# Patient Record
Sex: MALE | Race: WHITE | NOT HISPANIC OR LATINO | Employment: FULL TIME | URBAN - METROPOLITAN AREA
[De-identification: names, ages, dates, MRNs, and addresses within clinical notes are randomized per-mention and may not be internally consistent; named-entity substitution may affect disease eponyms.]

---

## 2017-01-10 ENCOUNTER — OFFICE VISIT (OUTPATIENT)
Dept: URGENT CARE | Facility: CLINIC | Age: 32
End: 2017-01-10
Payer: COMMERCIAL

## 2017-01-10 VITALS
OXYGEN SATURATION: 94 % | DIASTOLIC BLOOD PRESSURE: 66 MMHG | HEIGHT: 70 IN | WEIGHT: 175 LBS | SYSTOLIC BLOOD PRESSURE: 128 MMHG | BODY MASS INDEX: 25.05 KG/M2 | TEMPERATURE: 98.1 F | HEART RATE: 78 BPM

## 2017-01-10 DIAGNOSIS — B86 SCABIES: Primary | ICD-10-CM

## 2017-01-10 PROCEDURE — 99204 OFFICE O/P NEW MOD 45 MIN: CPT | Performed by: PHYSICIAN ASSISTANT

## 2017-01-10 RX ORDER — PERMETHRIN 50 MG/G
1 CREAM TOPICAL ONCE
Qty: 1 TUBE | Refills: 1 | Status: SHIPPED | OUTPATIENT
Start: 2017-01-10 | End: 2017-01-10

## 2017-01-10 NOTE — PATIENT INSTRUCTIONS
CONTROL OF TRANSMISSION -- Because scabies is transmitted by close or skin-to-skin contact, usual recommendations are that all members of the family and close contacts be treated at the same time to avoid an endless chain of cross contamination and reinfestation [63]. However, high quality randomized trials to confirm the efficacy of this practice are lacking [64].   Although fomite transfer of scabies is less of a concern than with head lice, there is some possibility that contaminated clothing or linens can result in spread of scabies or reinfestation. In general, scabies mites cannot survive for more than two to three days away from human skin. Appropriate options for items used within several days before treatment (clothing, linens, stuffed animals, etc.) include placing in a plastic bag for at least three days, machine washing with hot water and then ironing or drying in a hot dryer, or dry cleaning [38]. Fumigation of living areas is not indicated. Affected individuals can return to work, , or school the day after treatment [65].  Fomite transfer is a particular concern with crusted scabies. As a result, patients with crusted scabies should be isolated immediately, and strict barrier nursing procedures instituted if transmission to others is to be avoided. Serious institutional epidemics of scabies have resulted from failure to recognize the disease and take proper precautions [20]. Rooms previously used by patients with crusted scabies should be vacuumed and cleaned thoroughly and bedding should be washed and dried utilizing high heat cycles; pesticide sprays and fogs are not indicated [65,66]. Scabies contracted by healthy individuals from contact with crusted scabies is no different from ordinary scabies.

## 2017-01-10 NOTE — MR AVS SNAPSHOT
"        Boone LOPEZ Rossana   1/10/2017 3:30 PM   Office Visit   MRN: [id number]    Department:  [facility]   Dept Phone:  [phone]    Description:  Male : 1985   Provider:  Gerardo Thompson PA-C           Reason for Visit     Rash scabies      Allergies as of 1/10/2017     Allergen Noted Reactions    Ibuprofen 01/10/2017   Anaphylaxis    Honey 01/10/2017   Anaphylaxis, Swelling      You were diagnosed with     Scabies   [133.0.ICD-9-CM]  -  Primary       Vital Signs     Blood Pressure Pulse Temperature Height Weight Body Mass Index    128/66 mmHg 78 36.7 °C (98.1 °F) 1.778 m (5' 10\") 79.379 kg (175 lb) 25.11 kg/m2    Oxygen Saturation                   94%           Basic Information     Date Of Birth Sex Race Ethnicity Preferred Language    1985 Male White Non- English      Health Maintenance        Date Due Completion Dates    IMM DTaP/Tdap/Td Vaccine (1 - Tdap) 6/10/2005 ---    IMM INFLUENZA (1) 2016 ---            Current Immunizations     No immunizations on file.      Below and/or attached are the medications your provider expects you to take. Review all of your home medications and newly ordered medications with your provider and/or pharmacist. Follow medication instructions as directed by your provider and/or pharmacist. Please keep your medication list with you and share with your provider. Update the information when medications are discontinued, doses are changed, or new medications (including over-the-counter products) are added; and carry medication information at all times in the event of emergency situations     Allergies:  IBUPROFEN - Anaphylaxis     HONEY - Anaphylaxis,Swelling               Medications  Valid as of: January 10, 2017 -  4:03 PM    Generic Name Brand Name Tablet Size Instructions for use    Permethrin (Cream) ELIMITE 5 % Apply 1 Application to affected area(s) Once for 1 dose.        .                 Medicines prescribed today were sent to:    " [facility] #[id number] - [place], [place] - [address] [facility] [address]    [address] [facility] [address] [place] [address]    Phone: [phone] Fax: [phone]    Open 24 Hours?: No      Medication refill instructions:       If your prescription bottle indicates you have medication refills left, it is not necessary to call your provider’s office. Please contact your pharmacy and they will refill your medication.    If your prescription bottle indicates you do not have any refills left, you may request refills at any time through one of the following ways: The online [employer] system (except Urgent Care), by calling your provider’s office, or by asking your pharmacy to contact your provider’s office with a refill request. Medication refills are processed only during regular business hours and may not be available until the next business day. Your provider may request additional information or to have a follow-up visit with you prior to refilling your medication.   *Please Note: Medication refills are assigned a new Rx number when refilled electronically. Your pharmacy may indicate that no refills were authorized even though a new prescription for the same medication is available at the pharmacy. Please request the medicine by name with the pharmacy before contacting your provider for a refill.        Instructions    CONTROL OF TRANSMISSION -- Because scabies is transmitted by close or skin-to-skin contact, usual recommendations are that all members of the family and close contacts be treated at the same time to avoid an endless chain of cross contamination and reinfestation [63]. However, high quality randomized trials to confirm the efficacy of this practice are lacking [64].   Although fomite transfer of scabies is less of a concern than with head lice, there is some possibility that contaminated clothing or linens can result in spread of scabies or reinfestation. In general, scabies mites cannot survive for more than two to three days away  from human skin. Appropriate options for items used within several days before treatment (clothing, linens, stuffed animals, etc.) include placing in a plastic bag for at least three days, machine washing with hot water and then ironing or drying in a hot dryer, or dry cleaning [38]. Fumigation of living areas is not indicated. Affected individuals can return to work, , or school the day after treatment [65].  Fomite transfer is a particular concern with crusted scabies. As a result, patients with crusted scabies should be isolated immediately, and strict barrier nursing procedures instituted if transmission to others is to be avoided. Serious institutional epidemics of scabies have resulted from failure to recognize the disease and take proper precautions [20]. Rooms previously used by patients with crusted scabies should be vacuumed and cleaned thoroughly and bedding should be washed and dried utilizing high heat cycles; pesticide sprays and fogs are not indicated [65,66]. Scabies contracted by healthy individuals from contact with crusted scabies is no different from ordinary scabies.             SkyGiraffe Access Code: [email or web address]  Expires: 2/9/2017  3:27 PM    SkyGiraffe  A secure, online tool to manage your health information     [employer]’s SkyGiraffe® is a secure, online tool that connects you to your personalized health information from the privacy of your home -- day or night - making it very easy for you to manage your healthcare. Once the activation process is completed, you can even access your medical information using the SkyGiraffe fantasma, which is available for free in the Apple Fantasma store or Google Play store.     SkyGiraffe provides the following levels of access (as shown below):   My Chart Features   [facility] Doctor [facility]  Specialists [facility]  Urgent  Care [facility]  Primary Care  Doctor   Email your healthcare team securely and privately 24/7 X X X    Manage appointments: schedule  your next appointment; view details of past/upcoming appointments X      Request prescription refills. X      View recent personal medical records, including lab and immunizations X X X X   View health record, including health history, allergies, medications X X X X   Read reports about your outpatient visits, procedures, consult and ER notes X X X X   See your discharge summary, which is a recap of your hospital and/or ER visit that includes your diagnosis, lab results, and care plan. X X       How to register for [employer]:  1. Go to  [email or web address].  2. Click on the Sign Up Now box, which takes you to the New Member Sign Up page. You will need to provide the following information:  a. Enter your Nestio Access Code exactly as it appears at the top of this page. (You will not need to use this code after you’ve completed the sign-up process. If you do not sign up before the expiration date, you must request a new code.)   b. Enter your date of birth.   c. Enter your home email address.   d. Click Submit, and follow the next screen’s instructions.  3. Create a Nestio ID. This will be your Nestio login ID and cannot be changed, so think of one that is secure and easy to remember.  4. Create a Nestio password. You can change your password at any time.  5. Enter your Password Reset Question and Answer. This can be used at a later time if you forget your password.   6. Enter your e-mail address. This allows you to receive e-mail notifications when new information is available in Nestio.  7. Click Sign Up. You can now view your health information.    For assistance activating your Nestio account, call [phone]

## 2017-01-12 NOTE — PROGRESS NOTES
"Subjective:      PT is a 31 y.o. male who presents with Rash            Rash  This is a new problem. The current episode started in the past 7 days. The problem has been gradually worsening since onset. The rash is diffuse. The rash is characterized by redness and itchiness. He was exposed to a new animal. Past treatments include antihistamine and anti-itch cream. The treatment provided no relief.   PT states he got his girlfriend a teacup pig for Claudine which slept with them in the same bed and with a noted \"Rash\" which turned out to be scabies. Both the pt and his girlfriend were affected and he has yet to have treatment. He notes red, itchy and \"bumpy\" rash all over his body x 7 days or so. Pt has not taken any Rx medications for this condition.  PT states the pain is a 7/10 with itching, aching in nature and worse at night. Pt denies CP, SOB, NVD, paresthesias, headaches, dizziness, change in vision, hives, or other joint pain. The pt's medication list, problem list, and allergies have been evaluated and reviewed during today's visit.    PMH:  Negative per pt.      PSH:  Negative per pt.      Fam Hx:  Father with hx of HTN      Soc HX:  Social History     Social History   • Marital Status: Unknown     Spouse Name: N/A   • Number of Children: N/A   • Years of Education: N/A     Occupational History   • Not on file.     Social History Main Topics   • Smoking status: Not on file   • Smokeless tobacco: Not on file   • Alcohol Use: Not on file   • Drug Use: Not on file   • Sexual Activity: Not on file     Other Topics Concern   • Not on file     Social History Narrative   • No narrative on file         Medications:  No current outpatient prescriptions on file.      Allergies:  Ibuprofen and Honey        Review of Systems   Skin: Positive for rash.   Constitutional: Negative for fever, chills and malaise/fatigue.   HENT: Negative for congestion and sore throat.    Eyes: Negative for blurred vision, double vision " "and photophobia.   Respiratory: Negative for cough and shortness of breath.    Cardiovascular: Negative for chest pain and palpitations.   Gastrointestinal: Negative for heartburn, nausea, vomiting, abdominal pain, diarrhea and constipation.   Genitourinary: Negative for dysuria and flank pain.   Musculoskeletal: Negative for joint pain and myalgias.   Neurological: Negative for dizziness, tingling and headaches.   Endo/Heme/Allergies: Does not bruise/bleed easily.   Psychiatric/Behavioral: Negative for depression. The patient is not nervous/anxious.             Objective:     /66 mmHg  Pulse 78  Temp(Src) 36.7 °C (98.1 °F)  Ht 1.778 m (5' 10\")  Wt 79.379 kg (175 lb)  BMI 25.11 kg/m2  SpO2 94%     Physical Exam   Skin: Rash noted. No abrasion, no bruising, no burn, no ecchymosis, no laceration, no lesion, no petechiae and no purpura noted. Rash is macular. Rash is not papular, not maculopapular, not nodular, not pustular, not vesicular and not urticarial. No erythema.              Constitutional: PT is oriented to person, place, and time. PT appears well-developed and well-nourished. No distress.   HENT:   Head: Normocephalic and atraumatic.   Mouth/Throat: Oropharynx is clear and moist. No oropharyngeal exudate.   Eyes: Conjunctivae normal and EOM are normal. Pupils are equal, round, and reactive to light.   Neck: Normal range of motion. Neck supple. No thyromegaly present.   Cardiovascular: Normal rate, regular rhythm, normal heart sounds and intact distal pulses.  Exam reveals no gallop and no friction rub.    No murmur heard.  Pulmonary/Chest: Effort normal and breath sounds normal. No respiratory distress. PT has no wheezes. PT has no rales. Pt exhibits no tenderness.   Abdominal: Soft. Bowel sounds are normal. PT exhibits no distension and no mass. There is no tenderness. There is no rebound and no guarding.   Musculoskeletal: Normal range of motion. PT exhibits no edema and no tenderness. "   Neurological: PT is alert and oriented to person, place, and time. PT has normal reflexes. No cranial nerve deficit.       Psychiatric: PT has a normal mood and affect. PT behavior is normal. Judgment and thought content normal.          Assessment/Plan:     1. Scabies    - permethrin (ELIMITE) 5 % Cream; Apply 1 Application to affected area(s) Once for 1 dose.  Dispense: 1 Tube; Refill: 1    Pet pig has already been treated and symptoms resolved by the vet  Rest, fluids encouraged.  AVS with medical info given.  Pt was in full understanding and agreement with the plan.  Follow-up as needed if symptoms worsen or fail to improve.

## 2018-02-16 ENCOUNTER — NON-PROVIDER VISIT (OUTPATIENT)
Dept: URGENT CARE | Facility: CLINIC | Age: 33
End: 2018-02-16

## 2018-02-16 DIAGNOSIS — Z02.1 PRE-EMPLOYMENT DRUG SCREENING: ICD-10-CM

## 2018-02-16 LAB
AMP AMPHETAMINE: NORMAL
COC COCAINE: NORMAL
INT CON NEG: NORMAL
INT CON POS: NORMAL
MET METHAMPHETAMINES: NORMAL
OPI OPIATES: NORMAL
PCP PHENCYCLIDINE: NORMAL
POC DRUG COMMENT 753798-OCCUPATIONAL HEALTH: NEGATIVE
THC: NORMAL

## 2018-02-16 PROCEDURE — 80305 DRUG TEST PRSMV DIR OPT OBS: CPT | Performed by: NURSE PRACTITIONER

## 2018-06-03 ENCOUNTER — OFFICE VISIT (OUTPATIENT)
Dept: URGENT CARE | Facility: CLINIC | Age: 33
End: 2018-06-03
Payer: COMMERCIAL

## 2018-06-03 VITALS
OXYGEN SATURATION: 97 % | DIASTOLIC BLOOD PRESSURE: 72 MMHG | BODY MASS INDEX: 25.91 KG/M2 | TEMPERATURE: 97.2 F | HEART RATE: 66 BPM | HEIGHT: 70 IN | WEIGHT: 181 LBS | RESPIRATION RATE: 14 BRPM | SYSTOLIC BLOOD PRESSURE: 112 MMHG

## 2018-06-03 DIAGNOSIS — H66.002 ACUTE SUPPURATIVE OTITIS MEDIA OF LEFT EAR WITHOUT SPONTANEOUS RUPTURE OF TYMPANIC MEMBRANE, RECURRENCE NOT SPECIFIED: ICD-10-CM

## 2018-06-03 PROCEDURE — 99214 OFFICE O/P EST MOD 30 MIN: CPT | Performed by: NURSE PRACTITIONER

## 2018-06-03 RX ORDER — DEXTROAMPHETAMINE SACCHARATE, AMPHETAMINE ASPARTATE, DEXTROAMPHETAMINE SULFATE AND AMPHETAMINE SULFATE 5; 5; 5; 5 MG/1; MG/1; MG/1; MG/1
20 TABLET ORAL 2 TIMES DAILY
COMMUNITY

## 2018-06-03 RX ORDER — AZITHROMYCIN 250 MG/1
TABLET, FILM COATED ORAL
Qty: 6 TAB | Refills: 0 | Status: SHIPPED | OUTPATIENT
Start: 2018-06-03 | End: 2022-06-21

## 2018-06-03 ASSESSMENT — PATIENT HEALTH QUESTIONNAIRE - PHQ9: CLINICAL INTERPRETATION OF PHQ2 SCORE: 0

## 2018-06-03 ASSESSMENT — ENCOUNTER SYMPTOMS: COUGH: 0

## 2018-06-03 NOTE — PROGRESS NOTES
"Subjective:      Boone Tracy is a 32 y.o. male who presents with Otalgia (left ear)            Otalgia    There is pain in the left ear. This is a new problem. Episode onset: Pt reports he developed left ear pain last night. He states the pain was very sharp and he cannot hear well out of left ear. Denies any drainage. Has been taking Tylenol for pain with little relief. The problem occurs constantly. The problem has been gradually worsening. There has been no fever. Pertinent negatives include no coughing or ear discharge. He has tried acetaminophen for the symptoms. The treatment provided mild relief. There is no history of a chronic ear infection.       Review of Systems   HENT: Positive for ear pain. Negative for ear discharge.    Respiratory: Negative for cough.    All other systems reviewed and are negative.    History reviewed. No pertinent past medical history. History reviewed. No pertinent surgical history.   Social History     Social History   • Marital status: Unknown     Spouse name: N/A   • Number of children: N/A   • Years of education: N/A     Occupational History   • Not on file.     Social History Main Topics   • Smoking status: Never Smoker   • Smokeless tobacco: Never Used   • Alcohol use No   • Drug use: No   • Sexual activity: Not on file     Other Topics Concern   • Not on file     Social History Narrative   • No narrative on file          Objective:     /72   Pulse 66   Temp 36.2 °C (97.2 °F)   Resp 14   Ht 1.778 m (5' 10\")   Wt 82.1 kg (181 lb)   SpO2 97%   BMI 25.97 kg/m²      Physical Exam   Constitutional: He is oriented to person, place, and time. Vital signs are normal. He appears well-developed and well-nourished.   HENT:   Head: Normocephalic and atraumatic.   Right Ear: Tympanic membrane and external ear normal.   Left Ear: External ear normal. Tympanic membrane is erythematous and bulging. A middle ear effusion is present.   Eyes: EOM are normal. Pupils are equal, " round, and reactive to light.   Neck: Normal range of motion.   Cardiovascular: Normal rate and regular rhythm.    Pulmonary/Chest: Effort normal.   Musculoskeletal: Normal range of motion.   Neurological: He is alert and oriented to person, place, and time.   Skin: Skin is warm and dry. Capillary refill takes less than 2 seconds.   Psychiatric: He has a normal mood and affect. His speech is normal and behavior is normal. Thought content normal.   Vitals reviewed.              Assessment/Plan:     1. Acute suppurative otitis media of left ear without spontaneous rupture of tympanic membrane, recurrence not specified  - azithromycin (ZITHROMAX) 250 MG Tab; Take 2 tabs PO on the first day, then one tab PO daily for 4 days  Dispense: 6 Tab; Refill: 0    Tylenol as directed for pain  Flonase spray twice daily for one week  RTC if no improvement in ear pain  Supportive care, differential diagnoses, and indications for immediate follow-up discussed with patient.    Pathogenesis of diagnosis discussed including typical length and natural progression.      Instructed to return to  or nearest emergency department if symptoms fail to improve, for any change in condition, further concerns, or new concerning symptoms.  Patient states understanding of the plan of care and discharge instructions.

## 2018-08-01 ENCOUNTER — NON-PROVIDER VISIT (OUTPATIENT)
Dept: OCCUPATIONAL MEDICINE | Facility: CLINIC | Age: 33
End: 2018-08-01
Payer: COMMERCIAL

## 2018-08-01 DIAGNOSIS — Z02.1 PRE-EMPLOYMENT DRUG SCREENING: ICD-10-CM

## 2018-08-01 DIAGNOSIS — Z02.89 ENCOUNTER FOR OCCUPATIONAL HEALTH ASSESSMENT: ICD-10-CM

## 2018-08-01 LAB
AMP AMPHETAMINE: NORMAL
COC COCAINE: NORMAL
INT CON NEG: NORMAL
INT CON POS: NORMAL
MET METHAMPHETAMINES: NORMAL
OPI OPIATES: NORMAL
PCP PHENCYCLIDINE: NORMAL
POC DRUG COMMENT 753798-OCCUPATIONAL HEALTH: NORMAL
THC: NORMAL

## 2018-08-01 PROCEDURE — 8911 PR MRO FEE: Performed by: INTERNAL MEDICINE

## 2018-08-01 PROCEDURE — 80305 DRUG TEST PRSMV DIR OPT OBS: CPT | Performed by: PREVENTIVE MEDICINE

## 2019-02-10 ENCOUNTER — OFFICE VISIT (OUTPATIENT)
Dept: URGENT CARE | Facility: CLINIC | Age: 34
End: 2019-02-10
Payer: COMMERCIAL

## 2019-02-10 ENCOUNTER — HOSPITAL ENCOUNTER (OUTPATIENT)
Facility: MEDICAL CENTER | Age: 34
End: 2019-02-10
Attending: NURSE PRACTITIONER
Payer: COMMERCIAL

## 2019-02-10 VITALS
TEMPERATURE: 99 F | HEIGHT: 70 IN | SYSTOLIC BLOOD PRESSURE: 128 MMHG | RESPIRATION RATE: 18 BRPM | OXYGEN SATURATION: 96 % | DIASTOLIC BLOOD PRESSURE: 78 MMHG | BODY MASS INDEX: 25.05 KG/M2 | HEART RATE: 78 BPM | WEIGHT: 175 LBS

## 2019-02-10 DIAGNOSIS — Z20.2 EXPOSURE TO STD: ICD-10-CM

## 2019-02-10 LAB
APPEARANCE UR: NORMAL
BILIRUB UR STRIP-MCNC: NORMAL MG/DL
COLOR UR AUTO: YELLOW
GLUCOSE UR STRIP.AUTO-MCNC: NORMAL MG/DL
KETONES UR STRIP.AUTO-MCNC: NORMAL MG/DL
LEUKOCYTE ESTERASE UR QL STRIP.AUTO: NORMAL
NITRITE UR QL STRIP.AUTO: NORMAL
PH UR STRIP.AUTO: 6.5 [PH] (ref 5–8)
PROT UR QL STRIP: NORMAL MG/DL
RBC UR QL AUTO: NORMAL
SP GR UR STRIP.AUTO: 1.02
UROBILINOGEN UR STRIP-MCNC: 0.2 MG/DL

## 2019-02-10 PROCEDURE — 87591 N.GONORRHOEAE DNA AMP PROB: CPT

## 2019-02-10 PROCEDURE — 99212 OFFICE O/P EST SF 10 MIN: CPT | Performed by: NURSE PRACTITIONER

## 2019-02-10 PROCEDURE — 87491 CHLMYD TRACH DNA AMP PROBE: CPT

## 2019-02-10 PROCEDURE — 81002 URINALYSIS NONAUTO W/O SCOPE: CPT | Performed by: NURSE PRACTITIONER

## 2019-02-10 ASSESSMENT — ENCOUNTER SYMPTOMS
NEUROLOGICAL NEGATIVE: 1
DIARRHEA: 0
CHILLS: 0
SHORTNESS OF BREATH: 0
CARDIOVASCULAR NEGATIVE: 1
VOMITING: 0
EYES NEGATIVE: 1
ABDOMINAL PAIN: 0
FEVER: 0
NAUSEA: 0
CONSTIPATION: 0
WHEEZING: 0
HEADACHES: 0
RESPIRATORY NEGATIVE: 1
BACK PAIN: 0
DIZZINESS: 0
NECK PAIN: 0
MYALGIAS: 0
FLANK PAIN: 0

## 2019-02-10 NOTE — PROGRESS NOTES
"Subjective:   Boone Tracy is a 33 y.o. male who presents for Exposure to STD (X 5 days with redness)        HPI   Patient with new onset exposure to STD. States his ex girlfriend was recently diagnosed with chlamydia. Denies penile discharge or scrotal pain. Denies fever, chills or flank pain. Denies urinary symptoms.  States he has dry skin over genital area, which is normal for him. Denies any new lesions or vesicles.    Review of Systems   Constitutional: Negative for chills and fever.   Eyes: Negative.    Respiratory: Negative.  Negative for shortness of breath and wheezing.    Cardiovascular: Negative.  Negative for chest pain.   Gastrointestinal: Negative for abdominal pain, constipation, diarrhea, nausea and vomiting.   Genitourinary: Negative.  Negative for dysuria and flank pain.        Exposure to STD   Musculoskeletal: Negative for back pain, myalgias and neck pain.   Skin: Negative.    Neurological: Negative.  Negative for dizziness and headaches.      PMH:  has no past medical history on file.  MEDS:   Current Outpatient Prescriptions:   •  amphetamine-dextroamphetamine (ADDERALL) 20 MG Tab, Take 20 mg by mouth 2 times a day., Disp: , Rfl:   •  azithromycin (ZITHROMAX) 250 MG Tab, Take 2 tabs PO on the first day, then one tab PO daily for 4 days, Disp: 6 Tab, Rfl: 0  ALLERGIES:   Allergies   Allergen Reactions   • Ibuprofen Anaphylaxis   • Honey Anaphylaxis and Swelling     SURGHX: History reviewed. No pertinent surgical history.  SOCHX:  reports that he has never smoked. He has never used smokeless tobacco. He reports that he does not drink alcohol or use drugs.  FH: Family history was reviewed, no pertinent findings to report       Objective:   /78 (BP Location: Right arm, Patient Position: Sitting, BP Cuff Size: Adult)   Pulse 78   Temp 37.2 °C (99 °F) (Temporal)   Resp 18   Ht 1.778 m (5' 10\")   Wt 79.4 kg (175 lb)   SpO2 96%   BMI 25.11 kg/m²   Physical Exam   Constitutional: He is " oriented to person, place, and time. He appears well-developed and well-nourished. No distress.   HENT:   Right Ear: Hearing normal.   Left Ear: Hearing normal.   Mouth/Throat: Oropharynx is clear and moist and mucous membranes are normal.   Eyes: Pupils are equal, round, and reactive to light. Conjunctivae are normal.   Cardiovascular: Normal rate, regular rhythm and normal heart sounds.    No murmur heard.  Pulmonary/Chest: Effort normal and breath sounds normal. No respiratory distress.   Abdominal: Soft. Normal appearance and bowel sounds are normal. He exhibits no distension. There is no hepatosplenomegaly. There is no tenderness. There is no CVA tenderness.   Genitourinary:   Genitourinary Comments: Denies  exam   Neurological: He is alert and oriented to person, place, and time.   Skin: Skin is warm and dry. Capillary refill takes less than 2 seconds. He is not diaphoretic.   Psychiatric: He has a normal mood and affect. His behavior is normal. Judgment and thought content normal.   Vitals reviewed.        Assessment/Plan:   Assessment    1. Exposure to STD  - POCT Urinalysis  - CHLAMYDIA/GC PCR URINE OR SWAB; Future    UA negative  Will send for G/C and call patient with results.     Differential diagnosis, natural history, supportive care, and indications for immediate follow-up discussed.

## 2019-02-12 ENCOUNTER — TELEPHONE (OUTPATIENT)
Dept: SCHEDULING | Facility: IMAGING CENTER | Age: 34
End: 2019-02-12

## 2019-02-12 LAB
C TRACH DNA SPEC QL NAA+PROBE: NEGATIVE
N GONORRHOEA DNA SPEC QL NAA+PROBE: NEGATIVE
SPECIMEN SOURCE: NORMAL

## 2019-02-13 ENCOUNTER — TELEPHONE (OUTPATIENT)
Dept: URGENT CARE | Facility: CLINIC | Age: 34
End: 2019-02-13

## 2019-02-13 NOTE — TELEPHONE ENCOUNTER
Called and spoke with patient regarding negative G/C results. He marguerite no further questions at this time.

## 2019-06-22 ENCOUNTER — OFFICE VISIT (OUTPATIENT)
Dept: URGENT CARE | Facility: CLINIC | Age: 34
End: 2019-06-22
Payer: COMMERCIAL

## 2019-06-22 VITALS
TEMPERATURE: 98.3 F | HEIGHT: 70 IN | DIASTOLIC BLOOD PRESSURE: 64 MMHG | SYSTOLIC BLOOD PRESSURE: 108 MMHG | WEIGHT: 192.2 LBS | RESPIRATION RATE: 18 BRPM | BODY MASS INDEX: 27.52 KG/M2 | HEART RATE: 74 BPM | OXYGEN SATURATION: 95 %

## 2019-06-22 DIAGNOSIS — B96.89 ACUTE BACTERIAL SINUSITIS: ICD-10-CM

## 2019-06-22 DIAGNOSIS — J01.90 ACUTE BACTERIAL SINUSITIS: ICD-10-CM

## 2019-06-22 DIAGNOSIS — J02.9 SORE THROAT: ICD-10-CM

## 2019-06-22 LAB
INT CON NEG: NEGATIVE
INT CON POS: POSITIVE
S PYO AG THROAT QL: NORMAL

## 2019-06-22 PROCEDURE — 87880 STREP A ASSAY W/OPTIC: CPT | Performed by: NURSE PRACTITIONER

## 2019-06-22 PROCEDURE — 99214 OFFICE O/P EST MOD 30 MIN: CPT | Performed by: NURSE PRACTITIONER

## 2019-06-22 RX ORDER — SERTRALINE HYDROCHLORIDE 100 MG/1
200 TABLET, FILM COATED ORAL DAILY
COMMUNITY
End: 2022-06-21

## 2019-06-22 RX ORDER — AMOXICILLIN AND CLAVULANATE POTASSIUM 875; 125 MG/1; MG/1
1 TABLET, FILM COATED ORAL 2 TIMES DAILY
Qty: 14 TAB | Refills: 0 | Status: SHIPPED | OUTPATIENT
Start: 2019-06-22 | End: 2022-06-21

## 2019-06-22 ASSESSMENT — ENCOUNTER SYMPTOMS
MYALGIAS: 1
COUGH: 0
SORE THROAT: 1
EYE DISCHARGE: 0
NAUSEA: 1
CHILLS: 0
FEVER: 0
DIARRHEA: 0
HEADACHES: 0
ORTHOPNEA: 0

## 2019-06-22 ASSESSMENT — PAIN SCALES - GENERAL: PAINLEVEL: 2=MINIMAL-SLIGHT

## 2019-06-22 NOTE — PROGRESS NOTES
"Subjective:      Boone Tracy is a 34 y.o. male who presents with Sore Throat (x1 week ); Fatigue (x1 week ); and Body Aches            HPI New. 34 year old male with sore throat, fatigue and body aches x one week. Denies any fever, chills, myalgia, or cough. He does report some congestion with this illness. States so tired he fell asleep at dinner last night. He has no nausea or diarrhea. Appetite is good. No PCP. Has not taken anything for this issue.  Ibuprofen and Honey  Current Outpatient Prescriptions on File Prior to Visit   Medication Sig Dispense Refill   • amphetamine-dextroamphetamine (ADDERALL) 20 MG Tab Take 20 mg by mouth 2 times a day.     • azithromycin (ZITHROMAX) 250 MG Tab Take 2 tabs PO on the first day, then one tab PO daily for 4 days 6 Tab 0     No current facility-administered medications on file prior to visit.      Social History     Social History   • Marital status: Single     Spouse name: N/A   • Number of children: N/A   • Years of education: N/A     Occupational History   • Not on file.     Social History Main Topics   • Smoking status: Never Smoker   • Smokeless tobacco: Never Used   • Alcohol use No   • Drug use: No   • Sexual activity: Not on file     Other Topics Concern   • Not on file     Social History Narrative   • No narrative on file     family history is not on file.      Review of Systems   Constitutional: Positive for malaise/fatigue. Negative for chills and fever.   HENT: Positive for congestion and sore throat.    Eyes: Negative for discharge.   Respiratory: Negative for cough.    Cardiovascular: Negative for chest pain and orthopnea.   Gastrointestinal: Positive for nausea. Negative for diarrhea.   Musculoskeletal: Positive for myalgias.   Neurological: Negative for headaches.   Endo/Heme/Allergies: Negative for environmental allergies.          Objective:     /64   Pulse 74   Temp 36.8 °C (98.3 °F) (Temporal)   Resp 18   Ht 1.778 m (5' 10\")   Wt 87.2 kg (192 " lb 3.2 oz)   SpO2 95%   BMI 27.58 kg/m²      Physical Exam   Constitutional: He is oriented to person, place, and time. He appears well-developed and well-nourished. No distress.   HENT:   Head: Normocephalic and atraumatic.   Right Ear: External ear and ear canal normal. Tympanic membrane is not injected and not perforated. No middle ear effusion.   Left Ear: External ear and ear canal normal. Tympanic membrane is not injected and not perforated.  No middle ear effusion.   Nose: Mucosal edema and rhinorrhea present.   Mouth/Throat: No oropharyngeal exudate or posterior oropharyngeal erythema.   Eyes: Conjunctivae are normal. Right eye exhibits no discharge. Left eye exhibits no discharge.   Neck: Normal range of motion. Neck supple.   Cardiovascular: Normal rate, regular rhythm and normal heart sounds.    No murmur heard.  Pulmonary/Chest: Effort normal and breath sounds normal. No respiratory distress.   Musculoskeletal: Normal range of motion.   Normal movement of all 4 extremities.   Lymphadenopathy:     He has no cervical adenopathy.        Right: No supraclavicular adenopathy present.        Left: No supraclavicular adenopathy present.   Neurological: He is alert and oriented to person, place, and time. Gait normal.   Skin: Skin is warm and dry.   Psychiatric: He has a normal mood and affect. His behavior is normal. Thought content normal.   Nursing note and vitals reviewed.              Assessment/Plan:     1. Acute bacterial sinusitis  amoxicillin-clavulanate (AUGMENTIN) 875-125 MG Tab   2. Sore throat  POCT Rapid Strep A   strep negative.  Differential diagnosis, natural history, supportive care, and indications for immediate follow-up discussed at length.

## 2022-06-21 ENCOUNTER — OFFICE VISIT (OUTPATIENT)
Dept: URGENT CARE | Facility: CLINIC | Age: 37
End: 2022-06-21
Payer: COMMERCIAL

## 2022-06-21 VITALS
SYSTOLIC BLOOD PRESSURE: 112 MMHG | BODY MASS INDEX: 27.77 KG/M2 | WEIGHT: 194 LBS | RESPIRATION RATE: 18 BRPM | HEIGHT: 70 IN | TEMPERATURE: 98 F | OXYGEN SATURATION: 95 % | HEART RATE: 85 BPM | DIASTOLIC BLOOD PRESSURE: 66 MMHG

## 2022-06-21 DIAGNOSIS — K29.00 ACUTE GASTRITIS WITHOUT HEMORRHAGE, UNSPECIFIED GASTRITIS TYPE: ICD-10-CM

## 2022-06-21 PROCEDURE — 99213 OFFICE O/P EST LOW 20 MIN: CPT | Performed by: EMERGENCY MEDICINE

## 2022-06-21 RX ORDER — DEXTROAMPHETAMINE SACCHARATE, AMPHETAMINE ASPARTATE MONOHYDRATE, DEXTROAMPHETAMINE SULFATE AND AMPHETAMINE SULFATE 7.5; 7.5; 7.5; 7.5 MG/1; MG/1; MG/1; MG/1
CAPSULE, EXTENDED RELEASE ORAL
COMMUNITY
Start: 2022-06-11

## 2022-06-21 RX ORDER — ALCOHOL ANTISEPTIC PADS
PADS, MEDICATED (EA) TOPICAL
COMMUNITY
Start: 2022-04-28 | End: 2022-06-21

## 2022-06-21 RX ORDER — PANTOPRAZOLE SODIUM 40 MG/1
40 TABLET, DELAYED RELEASE ORAL DAILY
Qty: 30 TABLET | Refills: 0 | Status: SHIPPED | OUTPATIENT
Start: 2022-06-21 | End: 2022-07-21

## 2022-06-21 ASSESSMENT — ENCOUNTER SYMPTOMS
BLOOD IN STOOL: 0
HEARTBURN: 1
COUGH: 0
VOMITING: 1
CHILLS: 0
FEVER: 0
DIARRHEA: 0
SHORTNESS OF BREATH: 0
SORE THROAT: 1
CONSTIPATION: 0
ABDOMINAL PAIN: 1

## 2022-06-21 NOTE — PROGRESS NOTES
"  Subjective:     Boone Tracy  is a 37 y.o. male who presents for Sore Throat (X4-5 days, Swollen tonsils, painful to swallow ) and Gastrophageal Reflux (X5 days, Acid reflux keeping him up at night )       Patient is a 37-year-old male who presents complaining of sore throat for 4 to 5 days, no fever, no exposure to strep.  He also notes severe GERD, which has been refractory to changes in his diet, he vomits acid fluid 4-5 times every night over the last few weeks.  He does not know of any specific trigger, he says he is tried all the conservative measures without improvement.  He denies any prior history of same.  He is a non-smoker, only occasional alcohol.  He reports epigastric pain and pressure-like pain in his chest especially at night.  He has no other cardiac risk factors.  He has history of ADD and is on Adderall, he went through a great deal to get that diagnosed him to get the appropriate medication regimen in place, but he feels he is doing well from that perspective.  Never had any GI evaluation, and he does not have a primary care physician.  Has not taken any antipyretics today.   Review of Systems   Constitutional: Negative for chills, fever and malaise/fatigue.   HENT: Positive for sore throat.    Respiratory: Negative for cough and shortness of breath.    Cardiovascular: Positive for chest pain (pressure when supine  / ar night).   Gastrointestinal: Positive for abdominal pain, heartburn and vomiting. Negative for blood in stool, constipation and diarrhea.   All other systems reviewed and are negative.     Allergies   Allergen Reactions   • Ibuprofen Anaphylaxis   • Honey Anaphylaxis and Swelling     History reviewed. No pertinent past medical history.     Objective:   /66   Pulse 85   Temp 36.7 °C (98 °F) (Temporal)   Resp 18   Ht 1.778 m (5' 10\")   Wt 88 kg (194 lb)   SpO2 95%   BMI 27.84 kg/m²   Physical Exam  Vitals and nursing note reviewed.   Constitutional:       General: He " is not in acute distress.     Appearance: Normal appearance. He is normal weight. He is not ill-appearing (shotty nontender), toxic-appearing or diaphoretic.   HENT:      Head: Normocephalic and atraumatic.      Right Ear: Tympanic membrane normal.      Left Ear: Tympanic membrane normal.      Nose: No rhinorrhea.      Comments: No sinus tenderness     Mouth/Throat:      Mouth: Mucous membranes are moist.      Pharynx: No oropharyngeal exudate or posterior oropharyngeal erythema.      Comments: No postnasal drip noted  Eyes:      General: No scleral icterus.        Right eye: No discharge.         Left eye: Discharge present.  Cardiovascular:      Rate and Rhythm: Normal rate and regular rhythm.      Heart sounds: Normal heart sounds. No murmur heard.  Pulmonary:      Effort: Pulmonary effort is normal. No respiratory distress.      Breath sounds: Normal breath sounds. No wheezing or rhonchi.   Abdominal:      General: Abdomen is flat. There is no distension.      Palpations: Abdomen is soft. There is no mass.      Tenderness: There is abdominal tenderness. There is no guarding or rebound.      Comments: Epigastric pain, no rebound no guarding.  No lower abdominal pain   Musculoskeletal:      Cervical back: Normal range of motion and neck supple. No tenderness.      Right lower leg: No edema.      Left lower leg: No edema.   Lymphadenopathy:      Cervical: Cervical adenopathy present.   Skin:     General: Skin is warm.      Coloration: Skin is not jaundiced.      Findings: No rash.   Neurological:      Mental Status: He is alert and oriented to person, place, and time.   Psychiatric:         Mood and Affect: Mood normal.         Behavior: Behavior normal.         Thought Content: Thought content normal.           Assessment/Plan:     Encounter Diagnoses   Name Primary?   • Acute gastritis without hemorrhage, unspecified gastritis type      Patient with complaint of sore throat, but on further discussion it sounds  like this is probably esophagitis/gastritis with severe reflux also causing his sore throat.  On history and exam there is no concern for bacterial pharyngitis.  Given the patient has such severe symptoms, will go ahead and treat with a month of PPI, test for H. pylori as an outpatient and add antibiotics for that if appropriate.  The patient is given an urgent referral for primary care follow-up to assure that his symptoms are improving and see if further referral for GI is needed.  Patient was counseled on conservative measures including alcohol avoidance, but he only drinks once or twice a week so that is likely not causative.  After review of medication interactions in [facility], discussed that pantoprazole and Adderall do have a medication interaction that may increase the Adderall level due to increasing gastric pH.  Patient has a way of adjusting his adderall regimen to accomodate this, and understands the risk.       Plan for care for today's complaint includes PPI treatment, H. pylori testing..  Prescription for Pantoprazole provided.  Natural history, supportive care measures, and indications for immediate follow-up for Gastritis/H. pylori discussed.    Referral provided for Primary care follow-up.  Patient's questions answered.  Advised that patient arrange routine followup care with primary physician.    See AVS Instructions below for written guidance provided to patient on after-visit management and care in addition to our verbal discussion during the visit.    Please note that this dictation was created using voice recognition software. I have attempted to correct all errors, but there may be sound-alike, spelling, grammar and possibly content errors that I did not discover before finalizing the note.

## 2022-06-21 NOTE — PATIENT INSTRUCTIONS
H pylori instructions from [facility] provided.    No alcohol for next 4 weeks if possible.    Referral for primary care provided urgently.    Get H Pylori test performed BEFORE starting medication.    We discussed, the pantoprazole prescription may accentuate the effect of your Adderall, so you may require decreased dose.

## 2022-06-22 ENCOUNTER — HOSPITAL ENCOUNTER (OUTPATIENT)
Dept: LAB | Facility: MEDICAL CENTER | Age: 37
End: 2022-06-22
Attending: EMERGENCY MEDICINE
Payer: COMMERCIAL

## 2022-06-22 ENCOUNTER — APPOINTMENT (OUTPATIENT)
Dept: MEDICAL GROUP | Facility: LAB | Age: 37
End: 2022-06-22
Attending: EMERGENCY MEDICINE
Payer: COMMERCIAL

## 2022-06-22 ENCOUNTER — OFFICE VISIT (OUTPATIENT)
Dept: MEDICAL GROUP | Facility: LAB | Age: 37
End: 2022-06-22
Payer: COMMERCIAL

## 2022-06-22 ENCOUNTER — HOSPITAL ENCOUNTER (OUTPATIENT)
Dept: LAB | Facility: MEDICAL CENTER | Age: 37
End: 2022-06-22
Attending: PHYSICIAN ASSISTANT
Payer: COMMERCIAL

## 2022-06-22 VITALS
BODY MASS INDEX: 27.77 KG/M2 | RESPIRATION RATE: 16 BRPM | HEIGHT: 70 IN | SYSTOLIC BLOOD PRESSURE: 108 MMHG | WEIGHT: 194 LBS | TEMPERATURE: 97.2 F | OXYGEN SATURATION: 96 % | HEART RATE: 96 BPM | DIASTOLIC BLOOD PRESSURE: 70 MMHG

## 2022-06-22 DIAGNOSIS — Z76.89 ENCOUNTER TO ESTABLISH CARE: ICD-10-CM

## 2022-06-22 DIAGNOSIS — K21.00 GASTROESOPHAGEAL REFLUX DISEASE WITH ESOPHAGITIS, UNSPECIFIED WHETHER HEMORRHAGE: ICD-10-CM

## 2022-06-22 DIAGNOSIS — Z13.29 SCREENING FOR THYROID DISORDER: ICD-10-CM

## 2022-06-22 DIAGNOSIS — F90.9 ATTENTION DEFICIT HYPERACTIVITY DISORDER (ADHD), UNSPECIFIED ADHD TYPE: ICD-10-CM

## 2022-06-22 DIAGNOSIS — Z13.220 LIPID SCREENING: ICD-10-CM

## 2022-06-22 LAB
ALBUMIN SERPL BCP-MCNC: 4.4 G/DL (ref 3.2–4.9)
ALBUMIN/GLOB SERPL: 1.3 G/DL
ALP SERPL-CCNC: 89 U/L (ref 30–99)
ALT SERPL-CCNC: 83 U/L (ref 2–50)
ANION GAP SERPL CALC-SCNC: 11 MMOL/L (ref 7–16)
AST SERPL-CCNC: 52 U/L (ref 12–45)
BASOPHILS # BLD AUTO: 0.7 % (ref 0–1.8)
BASOPHILS # BLD: 0.05 K/UL (ref 0–0.12)
BILIRUB SERPL-MCNC: 0.7 MG/DL (ref 0.1–1.5)
BUN SERPL-MCNC: 9 MG/DL (ref 8–22)
CALCIUM SERPL-MCNC: 8.8 MG/DL (ref 8.5–10.5)
CHLORIDE SERPL-SCNC: 103 MMOL/L (ref 96–112)
CO2 SERPL-SCNC: 26 MMOL/L (ref 20–33)
CREAT SERPL-MCNC: 1.25 MG/DL (ref 0.5–1.4)
EOSINOPHIL # BLD AUTO: 0.25 K/UL (ref 0–0.51)
EOSINOPHIL NFR BLD: 3.5 % (ref 0–6.9)
ERYTHROCYTE [DISTWIDTH] IN BLOOD BY AUTOMATED COUNT: 47 FL (ref 35.9–50)
GFR SERPLBLD CREATININE-BSD FMLA CKD-EPI: 77 ML/MIN/1.73 M 2
GLOBULIN SER CALC-MCNC: 3.4 G/DL (ref 1.9–3.5)
GLUCOSE SERPL-MCNC: 74 MG/DL (ref 65–99)
HCT VFR BLD AUTO: 51.7 % (ref 42–52)
HGB BLD-MCNC: 17.1 G/DL (ref 14–18)
IMM GRANULOCYTES # BLD AUTO: 0.02 K/UL (ref 0–0.11)
IMM GRANULOCYTES NFR BLD AUTO: 0.3 % (ref 0–0.9)
LYMPHOCYTES # BLD AUTO: 1.93 K/UL (ref 1–4.8)
LYMPHOCYTES NFR BLD: 27.1 % (ref 22–41)
MCH RBC QN AUTO: 30.1 PG (ref 27–33)
MCHC RBC AUTO-ENTMCNC: 33.1 G/DL (ref 33.7–35.3)
MCV RBC AUTO: 90.9 FL (ref 81.4–97.8)
MONOCYTES # BLD AUTO: 0.59 K/UL (ref 0–0.85)
MONOCYTES NFR BLD AUTO: 8.3 % (ref 0–13.4)
NEUTROPHILS # BLD AUTO: 4.28 K/UL (ref 1.82–7.42)
NEUTROPHILS NFR BLD: 60.1 % (ref 44–72)
NRBC # BLD AUTO: 0 K/UL
NRBC BLD-RTO: 0 /100 WBC
PLATELET # BLD AUTO: 262 K/UL (ref 164–446)
PMV BLD AUTO: 11 FL (ref 9–12.9)
POTASSIUM SERPL-SCNC: 3.7 MMOL/L (ref 3.6–5.5)
PROT SERPL-MCNC: 7.8 G/DL (ref 6–8.2)
RBC # BLD AUTO: 5.69 M/UL (ref 4.7–6.1)
SODIUM SERPL-SCNC: 140 MMOL/L (ref 135–145)
TSH SERPL DL<=0.005 MIU/L-ACNC: 0.7 UIU/ML (ref 0.38–5.33)
WBC # BLD AUTO: 7.1 K/UL (ref 4.8–10.8)

## 2022-06-22 PROCEDURE — 99385 PREV VISIT NEW AGE 18-39: CPT | Performed by: PHYSICIAN ASSISTANT

## 2022-06-22 PROCEDURE — 83013 H PYLORI (C-13) BREATH: CPT

## 2022-06-22 PROCEDURE — 36415 COLL VENOUS BLD VENIPUNCTURE: CPT

## 2022-06-22 PROCEDURE — 80053 COMPREHEN METABOLIC PANEL: CPT

## 2022-06-22 PROCEDURE — 84443 ASSAY THYROID STIM HORMONE: CPT

## 2022-06-22 PROCEDURE — 85025 COMPLETE CBC W/AUTO DIFF WBC: CPT

## 2022-06-22 RX ORDER — SUCRALFATE 1 G/1
1 TABLET ORAL
Qty: 120 TABLET | Refills: 3 | Status: SHIPPED | OUTPATIENT
Start: 2022-06-22

## 2022-06-22 NOTE — PROGRESS NOTES
Subjective:     CC:  Diagnoses of Encounter to establish care, Lipid screening, Screening for thyroid disorder, Attention deficit hyperactivity disorder (ADHD), unspecified ADHD type, and Gastroesophageal reflux disease with esophagitis, unspecified whether hemorrhage were pertinent to this visit.    HISTORY OF THE PRESENT ILLNESS: Patient is a 37 y.o. male. This pleasant patient is here today to establish care and discuss acid reflux. His/her prior PCP was none.    Acid Reflux  Pain with swallowing, nausea, severe acid reflux that is worse with laying down. Some improvement with vomiting. Mostly vomiting bile. Denies blood or coffee ground emesis.     Has been taking probiotic, has not started PPI due to pending H pylori test.  Has been trying to modify diet, avoiding alcohol    No previous GI evaluation    Was previously drinking a lot of energy drinks, working new job which is stressful 12 hour days    Health Maintenance     - Dyslipidemia (30-45): defer  - Diabetes (HTN, HLD, BMI >25): ordered today  - Depression screening (PHQ-2 and/or PHQ-9): neg  Diet: trying to improve  Exercise: physically demanding job  Substance Use: rx'd HGH analogue by private clinic  Tobacco Use/counseling: denies    Infectious disease screening/Immunizations  --Immunizations: due for tetanus    Current Outpatient Medications Ordered in Epic   Medication Sig Dispense Refill   • sucralfate (CARAFATE) 1 GM Tab Take 1 Tablet by mouth 4 Times a Day,Before Meals and at Bedtime. 120 Tablet 3   • amphetamine-dextroamphetamine ER (ADDERALL XR) 30 MG XR capsule      • amphetamine-dextroamphetamine (ADDERALL) 20 MG Tab Take 20 mg by mouth 2 times a day.     • pantoprazole (PROTONIX) 40 MG Tablet Delayed Response Take 1 Tablet by mouth every day for 30 days. 30 Tablet 0     No current Epic-ordered facility-administered medications on file.         ROS:   ROS neg except as indicated above    Objective:     Exam: /70   Pulse 96   Temp 36.2  "°C (97.2 °F)   Resp 16   Ht 1.778 m (5' 10\")   Wt 88 kg (194 lb)   SpO2 96%  Body mass index is 27.84 kg/m².    General: Normal appearing. No distress.  HEENT: Normocephalic. Eyes conjunctiva clear lids without ptosis, pupils equal and reactive to light accommodation, ears normal shape and contour, canals are clear bilaterally, tympanic membranes are benign, nasal mucosa benign, oropharynx is without erythema, edema or exudates.   Neck: Supple without JVD or bruit. Thyroid is not enlarged.  Pulmonary: Clear to ausculation.  Normal effort. No rales, ronchi, or wheezing.  Cardiovascular: Regular rate and rhythm without murmur. Carotid and radial pulses are intact and equal bilaterally.  Abdomen: Soft, nontender, nondistended. Normal bowel sounds. Liver and spleen are not palpable  Neurologic: Grossly nonfocal  Lymph: No cervical or supraclavicular lymph nodes are palpable  Skin: Warm and dry.  No obvious lesions.  Musculoskeletal: Normal gait. No extremity cyanosis, clubbing, or edema.  Psych: Normal mood and affect. Alert and oriented x3. Judgment and insight is normal.      Assessment & Plan:   37 y.o. male with the following -    1. Encounter to establish care  Labs per orders  Vaccinations per orders  Screenings per orders      2. Lipid screening  - CBC WITH DIFFERENTIAL; Future  - Comp Metabolic Panel; Future    3. Screening for thyroid disorder  - TSH WITH REFLEX TO FT4; Future    4. Attention deficit hyperactivity disorder (ADHD), unspecified ADHD type  Chronic condition, stable    5. Gastroesophageal reflux disease with esophagitis, unspecified whether hemorrhage  Chronic condition, new to me  H pylori pending, tx per results  Start carafate and PPI after testing.  - sucralfate (CARAFATE) 1 GM Tab; Take 1 Tablet by mouth 4 Times a Day,Before Meals and at Bedtime.  Dispense: 120 Tablet; Refill: 3  - Referral to Gastroenterology      I spent a total of 20 minutes with record review, exam, communication with " the patient, communication with other providers, and documentation of this encounter.    Return in about 3 weeks (around 7/13/2022).    Please note that this dictation was created using voice recognition software. I have made every reasonable attempt to correct obvious errors, but I expect that there are errors of grammar and possibly content that I did not discover before finalizing the note.

## 2022-06-23 LAB — UREA BREATH TEST QL: NEGATIVE

## 2022-06-23 NOTE — RESULT ENCOUNTER NOTE
Negative H. pylori and patient with severe reflux/esophagitis.  Continue PPI, no antibiotics indicated for this.

## 2024-10-04 ENCOUNTER — OFFICE VISIT (OUTPATIENT)
Dept: URGENT CARE | Facility: CLINIC | Age: 39
End: 2024-10-04
Payer: COMMERCIAL

## 2024-10-04 VITALS
OXYGEN SATURATION: 97 % | BODY MASS INDEX: 24.62 KG/M2 | SYSTOLIC BLOOD PRESSURE: 128 MMHG | HEART RATE: 80 BPM | RESPIRATION RATE: 16 BRPM | DIASTOLIC BLOOD PRESSURE: 84 MMHG | TEMPERATURE: 98.9 F | HEIGHT: 70 IN | WEIGHT: 172 LBS

## 2024-10-04 DIAGNOSIS — K04.7 DENTAL INFECTION: ICD-10-CM

## 2024-10-04 PROCEDURE — 3074F SYST BP LT 130 MM HG: CPT | Performed by: PHYSICIAN ASSISTANT

## 2024-10-04 PROCEDURE — 99213 OFFICE O/P EST LOW 20 MIN: CPT | Performed by: PHYSICIAN ASSISTANT

## 2024-10-04 PROCEDURE — 3079F DIAST BP 80-89 MM HG: CPT | Performed by: PHYSICIAN ASSISTANT

## 2024-10-04 RX ORDER — AMOXICILLIN 500 MG/1
500 CAPSULE ORAL 2 TIMES DAILY
Qty: 20 CAPSULE | Refills: 0 | Status: SHIPPED | OUTPATIENT
Start: 2024-10-04 | End: 2024-10-14

## 2024-10-04 ASSESSMENT — ENCOUNTER SYMPTOMS: FEVER: 0

## 2024-10-04 ASSESSMENT — FIBROSIS 4 INDEX: FIB4 SCORE: 1.15
